# Patient Record
Sex: FEMALE
[De-identification: names, ages, dates, MRNs, and addresses within clinical notes are randomized per-mention and may not be internally consistent; named-entity substitution may affect disease eponyms.]

---

## 2018-01-23 ENCOUNTER — HOSPITAL ENCOUNTER (EMERGENCY)
Dept: HOSPITAL 5 - ED | Age: 43
Discharge: LEFT BEFORE DISCHARGE | End: 2018-01-23
Payer: SELF-PAY

## 2018-01-23 VITALS — DIASTOLIC BLOOD PRESSURE: 76 MMHG | SYSTOLIC BLOOD PRESSURE: 133 MMHG

## 2018-01-23 DIAGNOSIS — Z53.21: Primary | ICD-10-CM

## 2019-09-29 ENCOUNTER — HOSPITAL ENCOUNTER (EMERGENCY)
Dept: HOSPITAL 5 - ED | Age: 44
Discharge: HOME | End: 2019-09-29
Payer: SELF-PAY

## 2019-09-29 VITALS — SYSTOLIC BLOOD PRESSURE: 145 MMHG | DIASTOLIC BLOOD PRESSURE: 70 MMHG

## 2019-09-29 DIAGNOSIS — Z79.899: ICD-10-CM
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
## 2019-09-29 DIAGNOSIS — R51: ICD-10-CM

## 2019-09-29 DIAGNOSIS — S39.012A: Primary | ICD-10-CM
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
## 2019-09-29 PROCEDURE — 96372 THER/PROPH/DIAG INJ SC/IM: CPT

## 2019-09-29 PROCEDURE — 72100 X-RAY EXAM L-S SPINE 2/3 VWS: CPT

## 2019-09-29 PROCEDURE — 70450 CT HEAD/BRAIN W/O DYE: CPT

## 2019-09-29 PROCEDURE — 99282 EMERGENCY DEPT VISIT SF MDM: CPT

## 2019-09-29 NOTE — XRAY REPORT
LUMBOSACRAL SPINE 3 VIEWS



INDICATION / CLINICAL INFORMATION:

Low back pain for 8 days. Headache.



COMPARISON:

None available.

 

FINDINGS:



BONES / JOINT(S): There is minimal anterior spurring at L3-4 and L4-5 without significant disc space 
narrowing. The pedicles are intact and the SI joints are normal. There is no evidence of fracture, hauser
bluxation or destructive lesion.

SOFT TISSUES: No significant abnormality.



ADDITIONAL FINDINGS: None.



IMPRESSION: Minimal spondylosis without acute abnormality.



Signer Name: Curt Napier MD 

Signed: 9/29/2019 3:29 PM

 Workstation Name: [id number]
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
## 2019-09-29 NOTE — CAT SCAN REPORT
CT HEAD WITHOUT CONTRAST



INDICATION / CLINICAL INFORMATION:

headache.



TECHNIQUE:

All CT scans at this location are performed using CT dose reduction for ALARA by means of automated e
xposure control. 



COMPARISON:

None available.



FINDINGS:

HEMORRHAGE: No evidence of intracranial hemorrhage or extra-axial fluid collection.

EXTRA-AXIAL SPACES: Cortical sulci, sylvian fissures and basilar cisterns have an unremarkable appear
ance.

VENTRICULAR SYSTEM: The ventricular system is of normal size and configuration.

CEREBRAL PARENCHYMA: No areas of abnormal brain parenchymal attenuation are identified. There is no i
ndication of recent infarction. 

MIDLINE SHIFT OR HERNIATION: There is no mass effect.

CEREBELLUM / BRAINSTEM: Brainstem and cerebellum have an unremarkable appearance.

INTRACRANIAL VESSELS:No abnormalities are identified on this noncontrast head CT.

ORBITS: visualized portions of the orbits have an unremarkable appearance.

SOFT TISSUES of HEAD: No significant abnormality.

CALVARIUM: Evaluation of bone windows reveals no abnormalities.

PARANASAL SINUSES / MASTOID AIR CELLS: Paranasal sinuses are free from inflammatory mucosal disease. 
The frontal sinuses did not develop in this individual. Mastoid air cells are normally pneumatized.



IMPRESSION:

1. Normal head CT without contrast.



Signer Name: Marc Bailey MD 

Signed: 9/29/2019 6:10 PM

 Workstation Name: [id number]
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
## 2021-03-26 ENCOUNTER — HOSPITAL ENCOUNTER (EMERGENCY)
Dept: HOSPITAL 5 - ED | Age: 46
Discharge: HOME | End: 2021-03-26
Payer: SELF-PAY

## 2021-03-26 VITALS — DIASTOLIC BLOOD PRESSURE: 78 MMHG | SYSTOLIC BLOOD PRESSURE: 131 MMHG
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
## 2021-03-26 DIAGNOSIS — Z79.899: ICD-10-CM
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
## 2021-03-26 DIAGNOSIS — S39.012A: Primary | ICD-10-CM

## 2021-03-26 DIAGNOSIS — M62.830: ICD-10-CM

## 2021-03-26 DIAGNOSIS — M54.41: ICD-10-CM
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
## 2021-03-26 LAB
ALBUMIN SERPL-MCNC: 4.2 G/DL (ref 3.9–5)
ALT SERPL-CCNC: 15 UNITS/L (ref 7–56)
BASOPHILS # (AUTO): 0 K/MM3 (ref 0–0.1)
BASOPHILS NFR BLD AUTO: 0.5 % (ref 0–1.8)
BILIRUB UR QL STRIP: (no result)
BLOOD UR QL VISUAL: (no result)
BUN SERPL-MCNC: 4 MG/DL (ref 7–17)
BUN/CREAT SERPL: 5 %
CALCIUM SERPL-MCNC: 9.5 MG/DL (ref 8.4–10.2)
EOSINOPHIL # BLD AUTO: 0.1 K/MM3 (ref 0–0.4)
EOSINOPHIL NFR BLD AUTO: 1 % (ref 0–4.3)
HCT VFR BLD CALC: 37.3 % (ref 30.3–42.9)
HEMOLYSIS INDEX: 23
HGB BLD-MCNC: 12.1 GM/DL (ref 10.1–14.3)
LYMPHOCYTES # BLD AUTO: 1.3 K/MM3 (ref 1.2–5.4)
LYMPHOCYTES NFR BLD AUTO: 19 % (ref 13.4–35)
MCHC RBC AUTO-ENTMCNC: 33 % (ref 30–34)
MCV RBC AUTO: 81 FL (ref 79–97)
MONOCYTES # (AUTO): 0.4 K/MM3 (ref 0–0.8)
MONOCYTES % (AUTO): 6.1 % (ref 0–7.3)
PH UR STRIP: 6 [PH] (ref 5–7)
PLATELET # BLD: 306 K/MM3 (ref 140–440)
PROT UR STRIP-MCNC: (no result) MG/DL
RBC # BLD AUTO: 4.6 M/MM3 (ref 3.65–5.03)
RBC #/AREA URNS HPF: 1 /HPF (ref 0–6)
UROBILINOGEN UR-MCNC: < 2 MG/DL (ref ?–2)
WBC #/AREA URNS HPF: 1 /HPF (ref 0–6)

## 2021-03-26 PROCEDURE — 96372 THER/PROPH/DIAG INJ SC/IM: CPT

## 2021-03-26 PROCEDURE — 80053 COMPREHEN METABOLIC PANEL: CPT

## 2021-03-26 PROCEDURE — 85025 COMPLETE CBC W/AUTO DIFF WBC: CPT

## 2021-03-26 PROCEDURE — 74176 CT ABD & PELVIS W/O CONTRAST: CPT

## 2021-03-26 PROCEDURE — 99284 EMERGENCY DEPT VISIT MOD MDM: CPT

## 2021-03-26 PROCEDURE — 84703 CHORIONIC GONADOTROPIN ASSAY: CPT

## 2021-03-26 PROCEDURE — 83690 ASSAY OF LIPASE: CPT

## 2021-03-26 PROCEDURE — 36415 COLL VENOUS BLD VENIPUNCTURE: CPT

## 2021-03-26 PROCEDURE — 81001 URINALYSIS AUTO W/SCOPE: CPT

## 2021-03-26 NOTE — EVENT NOTE
ED Screening Note


ED Screening Note: 





right flank pain that began two days ago


no dyusria, no dark urine, no odor urine, no frequency 


no fever 


+n/v


no diarrhea 


normal BM today 


states that urgent care gave her tramadol, zofran, flomax


no PMHx 


no allergies to meds


LNMP two days ago 





This initial assessment/diagnostic orders/clinical plan/treatment(s) is/are 

subject to change based on patients health status, clinical progression and re-

assessment by fellow clinical providers in the ED. Further treatment and workup 

at subsequent clinical providers discretion. Patient/guardian urged not to elope

from the ED as their condition may be serious if not clinically assessed and 

managed. 





Initial orders include: 


labs, UA, CT, meds

## 2021-03-26 NOTE — EMERGENCY DEPARTMENT REPORT
ED Back Pain/Injury HPI





- General


Chief Complaint: Back Pain/Injury


Stated Complaint: KIDNEY STONE/BURNING/NUMB


Time Seen by Provider: 21 18:37


Source: patient


Limitations: No Limitations





- History of Present Illness


Initial Comments: 





Patient is a 45-year-old -American female with a history of uterine 

fibroids who presents to the ED with complaint of acute onset persistent 

nontraumatic right lateral low back pain which she describes as sharp, spasmic 

with a burning sensation and that radiates to the right hip for the last 5 days,

worse in the last 2 days.  Patient states that she was initially evaluated at an

urgent care clinic and was presumptively diagnosed with kidney stones and UTI 

without any imaging or lab tests performed during the visit.  Patient states 

that she was given a prescription of Flomax, ciprofloxacin and tramadol which 

have not helped control her pain.  Patient states that the pain got worse 

especially in the last 2 days despite taking these medications.  Patient states 

that the pain is worse with any movement or palpation of the lower back and that

she is unable to sleep because of worsening pain.  Patient denies dysuria, 

urinary frequency and urgency, chest pain, shortness of breath, fever, chills, 

vaginal bleeding, vaginal discharge, traumatic injury, heavy lifting, abdominal 

pain, dizziness, nausea and vomiting or diarrhea, change in vision, numbness and

tingling or weakness of lower extremities bilaterally, urinary retention, bowel 

incontinence or saddle paresthesia.


MD Complaint: back pain (right lateral low back pain)


-: Sudden, days(s) (5)


Similar Symptoms Previously: No


Place: home


Radiation: none


Severity: severe


Severity scale (0 -10): 7


Quality: sharp


Consistency: constant


Improves With: none


Worsens With: movement, walking


Context: turning/twisting


Associated Symptoms: denies other symptoms.  denies: confusion, weakness, 

numbness, difficulty walking, cough, difficulty urinating, diaphoresis, 

fever/chills, constipation, headaches, abdominal pain, loss of appetite, 

malaise, nausea/vomiting, rash, seizure, shortness of breath, other


Treatments Prior to Arrival: NSAIDS





- Related Data


                                  Previous Rx's











 Medication  Instructions  Recorded  Last Taken  Type


 


Cyclobenzaprine [Flexeril] 10 mg PO QHS PRN #10 tablet 19 Unknown Rx


 


Ibuprofen [Motrin] 600 mg PO Q8H PRN #20 tablet 19 Unknown Rx


 


Naproxen 500 mg PO Q12H PRN #30 tablet 21 Unknown Rx


 


carisoprodoL [Soma] 350 mg PO Q8H PRN #30 tablet 21 Unknown Rx


 


predniSONE [Deltasone] 40 mg PO QDAY #12 tab 21 Unknown Rx











                                    Allergies











Allergy/AdvReac Type Severity Reaction Status Date / Time


 


No Known Allergies Allergy   Unverified 18 11:50














ED Review of Systems


ROS: 


Stated complaint: KIDNEY STONE/BURNING/NUMB


Other details as noted in HPI





Constitutional: denies: chills, fever


Eyes: denies: eye pain, eye discharge, vision change


ENT: denies: ear pain, throat pain


Respiratory: denies: cough, shortness of breath, wheezing


Cardiovascular: denies: chest pain, palpitations


Endocrine: no symptoms reported


Gastrointestinal: denies: abdominal pain, nausea, diarrhea


Genitourinary: denies: urgency, dysuria, discharge


Musculoskeletal: back pain (lower back pain), myalgia.  denies: joint swelling, 

arthralgia


Skin: denies: rash, lesions


Neurological: denies: headache, weakness, paresthesias


Psychiatric: denies: anxiety, depression


Hematological/Lymphatic: denies: easy bleeding, easy bruising





ED Past Medical Hx





- Past Medical History


Previous Medical History?: Yes


Hx Kidney Stones: Yes


Additional medical history: Uterine fibroids





- Social History


Smoking Status: Never Smoker


Substance Use Type: Alcohol





- Medications


Home Medications: 


                                Home Medications











 Medication  Instructions  Recorded  Confirmed  Last Taken  Type


 


Cyclobenzaprine [Flexeril] 10 mg PO QHS PRN #10 tablet 19  Unknown Rx


 


Ibuprofen [Motrin] 600 mg PO Q8H PRN #20 tablet 19  Unknown Rx


 


Naproxen 500 mg PO Q12H PRN #30 tablet 21  Unknown Rx


 


carisoprodoL [Soma] 350 mg PO Q8H PRN #30 tablet 21  Unknown Rx


 


predniSONE [Deltasone] 40 mg PO QDAY #12 tab 21  Unknown Rx














ED Physical Exam





- General


Limitations: No Limitations


General appearance: alert, in no apparent distress





- Head


Head exam: Present: atraumatic, normocephalic, normal inspection





- Eye


Eye exam: Present: normal appearance, PERRL, EOMI


Pupils: Present: normal accommodation





- ENT


ENT exam: Present: normal exam, normal orophraynx, mucous membranes moist, TM's 

normal bilaterally, normal external ear exam





- Neck


Neck exam: Present: normal inspection, full ROM





- Respiratory


Respiratory exam: Present: normal lung sounds bilaterally.  Absent: respiratory 

distress, wheezes, rhonchi, stridor, chest wall tenderness, accessory muscle 

use, decreased breath sounds, prolonged expiratory





- Cardiovascular


Cardiovascular Exam: Present: regular rate, normal rhythm, normal heart sounds. 

 Absent: systolic murmur, diastolic murmur, rubs, gallop





- GI/Abdominal


GI/Abdominal exam: Present: soft, normal bowel sounds.  Absent: tenderness, 

guarding, rebound, hyperactive bowel sounds, hypoactive bowel sounds





- Extremities Exam


Extremities exam: Present: normal inspection, full ROM, normal capillary refill.

  Absent: tenderness





- Back Exam


Back exam: Present: normal inspection, full ROM, tenderness (Palpable right 

lateral lumbosacral paraspinal musculoskeletal tenderness), muscle spasm, 

paraspinal tenderness.  Absent: CVA tenderness (L), vertebral tenderness, rash 

noted





- Neurological Exam


Neurological exam: Present: alert, oriented X3, CN II-XII intact, normal gait, 

reflexes normal





- Psychiatric


Psychiatric exam: Present: normal affect, normal mood





- Skin


Skin exam: Present: warm, dry, intact, normal color.  Absent: rash





ED Course


                                   Vital Signs











  21





  18:29 19:51 19:53


 


Temperature 99.2 F  


 


Pulse Rate 73  


 


Respiratory 16 18 18





Rate   


 


Blood Pressure 159/82  





[Right]   


 


O2 Sat by Pulse 100  





Oximetry   














ED Medical Decision Making





- Lab Data


Result diagrams: 


                                 21 18:46





                                 21 18:46





- Radiology Data


Radiology results: report reviewed, image reviewed





[facility]  


                                     [address] [place] [address]  


 


                                          Cat Scan Report   


                                               Signed  


 


Patient: KEKE COHEN                                                       

         MR#: [id number]  


[id number]          


: 1975                                                                

Acct:[id number]      


 


Age/Sex: 45 / F                                                                

ADM Date: 21     


 


Loc: ED       


Attending Dr:   


 


 


Ordering Physician: JAVI NGUYEN  


Date of Service: 21  


Procedure(s): CT abdomen pelvis wo con  


Accession Number(s): [id number]  


 


cc: LEIGHANN L. JERRY, PA   


 


 


CT ABDOMEN AND PELVIS WITHOUT CONTRAST  


 


 INDICATION / CLINICAL INFORMATION:  


 right flank pain.  


 


 TECHNIQUE:  


 Axial CT images were obtained through the abdomen and pelvis without IV 

contrast.  All CT scans at 


this location are performed using CT dose reduction for ALARA by means of 

automated exposure 


control.   


 


 COMPARISON:  


 None available.  


 


 FINDINGS:  


 


 LOWER CHEST: No significant abnormality.  


 LIVER: No significant abnormality.  


 GALLBLADDER: No significant abnormality.    


 PANCREAS: No significant abnormality.  


 SPLEEN: No significant abnormality.  


 ADRENALS: No significant abnormality.  


 KIDNEYS / URETERS: No significant abnormality.  


 URINARY BLADDER: No significant abnormality.  


 REPRODUCTIVE ORGANS: There are multiple uterine soft tissue density masses, the

 largest measuring 


nearly 17 cm in maximum dimension.  


 


 STOMACH / SMALL BOWEL: No significant abnormality.   


 COLON: No significant abnormality.   


 APPENDIX: No significant abnormality. Appendicolith is seen.    


 PERITONEUM: No free fluid. No free air. No fluid collection.  


 LYMPH NODES: No significant adenopathy.  


 AORTA / ARTERIES: No significant abnormality.   


 IVC / VEINS: No significant abnormality.  


 


 SKELETAL SYSTEM: No significant abnormality.  


 


 ADDITIONAL FINDINGS: None.  


 


 


 IMPRESSION:  


 1. Multiple large uterine masses, the largest measuring nearly 17 cm in 

greatest dimension. These 


likely represent very large fibroids, although neoplasm cannot be completely 

excluded. There is no 


lymphadenopathy.  


 2. No acute abdominopelvic abnormality.  


 


 Signer Name: Chika Lee MD   


 Signed: 3/26/2021 8:31 PM  


 Workstation Name: [id number]   


 


 


Transcribed By: SS  


Dictated By: CHIKA LEE  


Electronically Authenticated By: CHIKA LEE    


Signed Date/Time: 21                                


 


 


 


DD/DT: 21                                                            

  


TD/TT:





























- Medical Decision Making





This is a 45-year-old -American female with a history of uterine fibroids

 who presents to the ED with complaint of acute onset persistent nontraumatic 

right lateral low back pain which she describes as sharp, spasmic with a burning

 sensation and that radiates to the right hip for the last 5 days, worse in the 

last 2 days.  Patient states that she was initially evaluated at an urgent care 

clinic and was presumptively diagnosed with kidney stones and UTI without any 

imaging or lab tests performed.  Patient states that she was given a p

rescription of Flomax, ciprofloxacin and tramadol which have not helped control 

her pain.  Patient states that the pain got worse especially in the last 2 days 

despite taking these medications.  Patient states that the pain is worse with 

any movement or palpation of the lower back and that she is unable to sleep 

because of worsening pain.  In the ED, patient is alert and oriented x3 and is 

not in distress.  Patient was treated for pain in the ED, and lab test results 

were reviewed and are all nonactionable.  The abdomen pelvis CT scan without 

contrast showed no acute abdominal pelvic abnormalities.  Incidentally the 

imaging also showed multiple large uterine masses, the largest measuring nearly 

17 cm in greatest dimension. These likely represent very large fibroids, 

although neoplasm cannot be completely excluded. There is no lymphadenopathy.  

On reevaluation, patient's pain is well controlled with medications.  Therefore 

patient symptoms are likely due to muscle strain or muscle spasm of lower back 

versus sciatica based on the history, physical exam findings and normal lab test

 results as well as imaging reports.  Patient was discharged home on 

prescriptions of muscle relaxants and anti-inflammatory pain medications, and 

was advised to follow-up with her primary care physician in 5 to 7 days for 

reevaluation.  Patient was was advised return to the ED immediately if symptoms 

get worse.


  





- Differential Diagnosis


Muscle spasm; kidney stones; UTI; Muscle strain; Sciatica


Critical care attestation.: 


If time is entered above; I have spent that time in minutes in the direct care 

of this critically ill patient, excluding procedure time.








ED Disposition


Clinical Impression: 


 Spasm of muscle of lower back, Strain of muscle, fascia and tendon of lower 

back, initial encounter





Acute low back pain with right-sided sciatica


Qualifiers:


 Back pain laterality: right Qualified Code(s): M54.41 - Lumbago with sciatica, 

right side





Disposition: DC- TO HOME OR SELFCARE


Is pt being admited?: No


Does the pt Need Aspirin: No


Condition: Stable


Instructions:  Muscle Cramps and Spasms, Easy-to-Read, Muscle Strain, 

Easy-to-Read, Sciatica, Easy-to-Read, Low Back Sprain or Strain Rehab-SportsMed


Additional Instructions: 


Lab test results were reviewed and are all nonactionable.  Abdomen pelvis CT 

scan without contrast showed no acute abnormalities but multiple large uterine 

masses, the largest measuring nearly 17 cm in greatest dimension. These likely 

represent very large fibroids, although neoplasm cannot be completely excluded. 

There is no lymphadenopathy.  Therefore your low back pain is likely due to 

muscle strain or muscle spasm.  Therefore take medications as needed for pain, 

drink plenty of fluids and follow-up with your primary care physician in 5 to 7 

days for reevaluation.  Consider following up also with your OB/GYN physician 

for further evaluation of the uterine fibroids identified on the abdomen pelvis 

CT scan without contrast.  Return to the ED immediately if your symptoms get 

worse.


Prescriptions: 


predniSONE [Deltasone] 40 mg PO QDAY #12 tab


Naproxen 500 mg PO Q12H PRN #30 tablet


 PRN Reason: Pain , Severe (7-10)


carisoprodoL [Soma] 350 mg PO Q8H PRN #30 tablet


 PRN Reason: Muscle Spasm


Referrals: 


[facility] [Provider Group] - 3-5 Days


Forms:  Work/School Release Form(ED)


Time of Disposition: 20:48


Print Language: ENGLISH

## 2021-03-26 NOTE — CAT SCAN REPORT
CT ABDOMEN AND PELVIS WITHOUT CONTRAST



INDICATION / CLINICAL INFORMATION:

right flank pain.



TECHNIQUE:

Axial CT images were obtained through the abdomen and pelvis without IV contrast.  All CT scans at [facility] location are performed using CT dose reduction for ALARA by means of automated exposure control. 



COMPARISON:

None available.



FINDINGS:



LOWER CHEST: No significant abnormality.

LIVER: No significant abnormality.

GALLBLADDER: No significant abnormality.  

PANCREAS: No significant abnormality.

SPLEEN: No significant abnormality.

ADRENALS: No significant abnormality.

KIDNEYS / URETERS: No significant abnormality.

URINARY BLADDER: No significant abnormality.

REPRODUCTIVE ORGANS: There are multiple uterine soft tissue density masses, the largest measuring kathleen
rly 17 cm in maximum dimension.



STOMACH / SMALL BOWEL: No significant abnormality. 

COLON: No significant abnormality. 

APPENDIX: No significant abnormality. Appendicolith is seen.  

PERITONEUM: No free fluid. No free air. No fluid collection.

LYMPH NODES: No significant adenopathy.

AORTA / ARTERIES: No significant abnormality. 

IVC / VEINS: No significant abnormality.



SKELETAL SYSTEM: No significant abnormality.



ADDITIONAL FINDINGS: None.





IMPRESSION:

1. Multiple large uterine masses, the largest measuring nearly 17 cm in greatest dimension. These lik
ely represent very large fibroids, although neoplasm cannot be completely excluded. There is no lymph
adenopathy.

2. No acute abdominopelvic abnormality.



Signer Name: Ryan Lee MD 

Signed: 3/26/2021 8:31 PM

Workstation Name: [id number]